# Patient Record
Sex: FEMALE | Race: WHITE | NOT HISPANIC OR LATINO | RURAL
[De-identification: names, ages, dates, MRNs, and addresses within clinical notes are randomized per-mention and may not be internally consistent; named-entity substitution may affect disease eponyms.]

---

## 2022-03-26 ENCOUNTER — HOSPITAL ENCOUNTER (EMERGENCY)
Facility: HOSPITAL | Age: 22
Discharge: HOME | End: 2022-03-26
Payer: MEDICAID

## 2022-03-26 VITALS
WEIGHT: 150 LBS | HEIGHT: 66 IN | BODY MASS INDEX: 24.11 KG/M2 | RESPIRATION RATE: 16 BRPM | TEMPERATURE: 98 F | SYSTOLIC BLOOD PRESSURE: 112 MMHG | OXYGEN SATURATION: 98 % | HEART RATE: 88 BPM | DIASTOLIC BLOOD PRESSURE: 71 MMHG

## 2022-03-26 DIAGNOSIS — S61.211A LACERATION OF LEFT INDEX FINGER WITHOUT FOREIGN BODY WITHOUT DAMAGE TO NAIL, INITIAL ENCOUNTER: Primary | ICD-10-CM

## 2022-03-26 PROCEDURE — 25000003 PHARM REV CODE 250: Performed by: EMERGENCY MEDICINE

## 2022-03-26 PROCEDURE — 99282 PR EMERGENCY DEPT VISIT,LEVEL II: ICD-10-PCS | Mod: ,,, | Performed by: EMERGENCY MEDICINE

## 2022-03-26 PROCEDURE — 99282 EMERGENCY DEPT VISIT SF MDM: CPT | Mod: ,,, | Performed by: EMERGENCY MEDICINE

## 2022-03-26 PROCEDURE — 99283 EMERGENCY DEPT VISIT LOW MDM: CPT

## 2022-03-26 RX ADMIN — BACITRACIN ZINC, NEOMYCIN, POLYMYXIN B 1 EACH: 400; 3.5; 5 OINTMENT TOPICAL at 04:03

## 2022-03-26 NOTE — DISCHARGE INSTRUCTIONS
Please make sure to keep the wound clean with plenty of tap water and soap. If your cut becomes worse or does not heal, or if you develop signs and symptoms of infection, including fevers, please return to the ED as soon as possible.     Please see your PCP in 1 week for ED follow up.

## 2022-03-26 NOTE — ED PROVIDER NOTES
Encounter Date: 3/26/2022       History     Chief Complaint   Patient presents with    Laceration     Pt is a 21 y.o.  female presenting with L index finger LAC since this afternoon. Pt was adjusting the buckle on her daughter's shoe when she sustained the injury. Pt denies any associated sx such as fever, chills, palpitations, diaphoresis, lightheadedness, nausea and vomiting. She has no medical problems and takes no daily medication. She does not suffer from any bleeding diathesis.         Review of patient's allergies indicates:   Allergen Reactions    Penicillins     Amoxicillin-pot clavulanate Nausea And Vomiting and Rash     History reviewed. No pertinent past medical history.  Past Surgical History:   Procedure Laterality Date    SKIN GRAFT Right      History reviewed. No pertinent family history.  Social History     Tobacco Use    Smoking status: Never Smoker    Smokeless tobacco: Never Used   Substance Use Topics    Alcohol use: Never    Drug use: Never     Review of Systems   Constitutional: Negative for fever.   HENT: Negative for sore throat.    Respiratory: Negative for shortness of breath.    Cardiovascular: Negative for chest pain.   Gastrointestinal: Negative for nausea.   Genitourinary: Negative for dysuria.   Musculoskeletal: Negative for back pain.   Skin: Positive for wound (L index finger LAC). Negative for rash.   Neurological: Negative for weakness.   Hematological: Does not bruise/bleed easily.       Physical Exam     Initial Vitals [03/26/22 1554]   BP Pulse Resp Temp SpO2   112/71 88 16 98 °F (36.7 °C) 98 %      MAP       --         Physical Exam    Constitutional: She appears well-developed and well-nourished. She is not diaphoretic. No distress.   HENT:   Head: Normocephalic and atraumatic.   Right Ear: External ear normal.   Left Ear: External ear normal.   Nose: Nose normal.   Mouth/Throat: Oropharynx is clear and moist. No oropharyngeal exudate.   Eyes: EOM are normal.  Right eye exhibits no discharge. Left eye exhibits no discharge. No scleral icterus.   Cardiovascular: Normal rate, regular rhythm, normal heart sounds and intact distal pulses. Exam reveals no gallop and no friction rub.    No murmur heard.  Pulmonary/Chest: Breath sounds normal. No respiratory distress. She has no wheezes. She has no rhonchi. She has no rales. She exhibits no tenderness.   Abdominal: Abdomen is soft. Bowel sounds are normal. She exhibits no distension and no mass. There is no abdominal tenderness. There is no rebound and no guarding.   Musculoskeletal:         General: No tenderness or edema. Normal range of motion.     Neurological: She is alert and oriented to person, place, and time.   Skin: Laceration noted.   1.5 cm superficial linear LAC noted to the lateral aspect of the disal L index finger. No foreign object detected. No purulence, erythema or swelling noted. FROM of the index finger. Neurovascularly intact.           Medical Screening Exam   See Full Note    ED Course   Procedures  Labs Reviewed - No data to display       Imaging Results    None          Medications   neomycin-bacitracnZn-polymyxnB packet (1 each Topical (Top) Given 3/26/22 8873)           APC / Resident Notes:   LAC is superficial and does not require sutures. Will apply antibiotic ointment and bandage and have the PT return should symptoms persist or worsen. Pt understands and agrees with plan of care.        Attending Attestation:   Physician Attestation Statement for Resident:  As the supervising MD   Physician Attestation Statement: I have personally seen and examined this patient.   I agree with the above history. -:   As the supervising MD I agree with the above PE.    As the supervising MD I agree with the above treatment, course, plan, and disposition.                      Clinical Impression:   Final diagnoses:  [S61.211A] Laceration of left index finger without foreign body without damage to nail, initial  encounter (Primary)          ED Disposition Condition    Discharge Stable        ED Prescriptions     None        Follow-up Information     Follow up With Specialties Details Why Contact Info      In 1 week  Please see your PCP in 1 week for ED follow up.           Norberto Swift DO  Resident  03/26/22 1701       Reyes Farris MD  03/26/22 1721

## 2022-04-19 ENCOUNTER — HOSPITAL ENCOUNTER (EMERGENCY)
Facility: HOSPITAL | Age: 22
Discharge: HOME | End: 2022-04-19
Attending: EMERGENCY MEDICINE
Payer: MEDICAID

## 2022-04-19 VITALS
BODY MASS INDEX: 24.91 KG/M2 | SYSTOLIC BLOOD PRESSURE: 106 MMHG | TEMPERATURE: 98 F | RESPIRATION RATE: 18 BRPM | DIASTOLIC BLOOD PRESSURE: 77 MMHG | OXYGEN SATURATION: 99 % | WEIGHT: 155 LBS | HEART RATE: 89 BPM | HEIGHT: 66 IN

## 2022-04-19 DIAGNOSIS — R42 LIGHTHEADEDNESS: ICD-10-CM

## 2022-04-19 LAB
AMORPH PHOS CRY #/AREA URNS LPF: ABNORMAL /LPF
BACTERIA #/AREA URNS HPF: ABNORMAL /HPF
BILIRUB UR QL STRIP: NEGATIVE
CLARITY UR: CLEAR
COARSE GRAN CASTS #/AREA URNS LPF: ABNORMAL /LPF
COLOR UR: ABNORMAL
GLUCOSE UR STRIP-MCNC: NEGATIVE MG/DL
KETONES UR STRIP-SCNC: NEGATIVE MG/DL
LEUKOCYTE ESTERASE UR QL STRIP: NEGATIVE
MUCOUS THREADS #/AREA URNS HPF: ABNORMAL /HPF
NITRITE UR QL STRIP: NEGATIVE
PH UR STRIP: 7.5 PH UNITS
PROT UR QL STRIP: NEGATIVE
RBC # UR STRIP: ABNORMAL /UL
RBC #/AREA URNS HPF: ABNORMAL /HPF
SP GR UR STRIP: 1.01
SQUAMOUS #/AREA URNS LPF: ABNORMAL /LPF
UROBILINOGEN UR STRIP-ACNC: 1 MG/DL
WBC #/AREA URNS HPF: ABNORMAL /HPF

## 2022-04-19 PROCEDURE — 99283 EMERGENCY DEPT VISIT LOW MDM: CPT | Mod: ,,, | Performed by: EMERGENCY MEDICINE

## 2022-04-19 PROCEDURE — 99283 EMERGENCY DEPT VISIT LOW MDM: CPT

## 2022-04-19 PROCEDURE — 99283 PR EMERGENCY DEPT VISIT,LEVEL III: ICD-10-PCS | Mod: ,,, | Performed by: EMERGENCY MEDICINE

## 2022-04-19 PROCEDURE — 81001 URINALYSIS AUTO W/SCOPE: CPT | Performed by: EMERGENCY MEDICINE

## 2022-04-19 NOTE — DISCHARGE INSTRUCTIONS
Follow-up with primary care provider.  Return to ER if her symptoms are worsening or new symptoms develop    Increase your water intake.  Decrease caffeinated drinks.    Use ibuprofen as needed for your occasional headaches.

## 2022-04-19 NOTE — ED PROVIDER NOTES
Encounter Date: 4/19/2022       History     Chief Complaint   Patient presents with    Dizziness     Pt states she woke up and bent over and became dizzy     Patient reports that she had an episode of lightheadedness this morning that lasted for approximately an hour.  It was worse when she got up and moved around.  She did not feel palpitations or chest pain.  She got up during the night with her child who had some subjective fever.  However patient has not had a runny nose or coughing or any other constitutional symptoms.  Patient says she does have history of some heavy cycles but are irregular.  Has not had a prior blood transfusion has not noticed any color changes.  No prior history of thyroid dysfunction.  She has gained weight since her last child a few years ago but no associated constipation no prior workups.  No earache or recent vertigo.  Patient did vomit will accident 3 months ago has had some neck discomfort and some occasional headaches controlled with Tylenol.  She has been under the care of a chiropractor.  No associated focal weakness or numbness of her arms or legs.  Symptoms have resolved spontaneously this morning.  No other associated symptoms or modifying factors.        Review of patient's allergies indicates:   Allergen Reactions    Penicillins     Amoxicillin-pot clavulanate Nausea And Vomiting and Rash     History reviewed. No pertinent past medical history.  Past Surgical History:   Procedure Laterality Date    SKIN GRAFT Right      History reviewed. No pertinent family history.  Social History     Tobacco Use    Smoking status: Never Smoker    Smokeless tobacco: Never Used   Substance Use Topics    Alcohol use: Never    Drug use: Never     Review of Systems   Constitutional: Positive for fatigue. Negative for fever.   HENT: Negative for sore throat.    Respiratory: Negative for shortness of breath.    Cardiovascular: Negative for chest pain.   Gastrointestinal: Negative for  nausea.   Genitourinary: Negative for dysuria.   Musculoskeletal: Negative for back pain.   Skin: Negative for rash.   Neurological: Positive for light-headedness. Negative for weakness.   Hematological: Does not bruise/bleed easily.   Psychiatric/Behavioral: Negative for decreased concentration.       Physical Exam     Initial Vitals [04/19/22 0607]   BP Pulse Resp Temp SpO2   106/77 89 18 97.6 °F (36.4 °C) 99 %      MAP       --         Physical Exam    Constitutional: She appears well-developed and well-nourished.   HENT:   Head: Normocephalic and atraumatic.   Eyes: EOM are normal. Pupils are equal, round, and reactive to light.   Neck: Neck supple. No thyromegaly present.   Normal range of motion.  Cardiovascular: Normal rate and regular rhythm.   Pulmonary/Chest: Breath sounds normal. No respiratory distress. She has no rhonchi. She exhibits no tenderness.   Abdominal: Abdomen is soft.   Musculoskeletal:         General: Normal range of motion.      Cervical back: Normal range of motion and neck supple.     Neurological: She is alert and oriented to person, place, and time.   Skin: Skin is warm and dry. Capillary refill takes less than 2 seconds.   Psychiatric: She has a normal mood and affect.         Medical Screening Exam   See Full Note    ED Course   Procedures  Labs Reviewed   URINALYSIS, REFLEX TO URINE CULTURE - Abnormal; Notable for the following components:       Result Value    Blood, UA Large (*)     All other components within normal limits   URINALYSIS, MICROSCOPIC - Abnormal; Notable for the following components:    Bacteria, UA Few (*)     Squamous Epithelial Cells, UA Few (*)     Mucus, UA Few (*)     Coarse Granular Casts, UA Rare (*)     Amorphous Crystals, UA Few (*)     All other components within normal limits   DRUG SCREEN, URINE (BEAKER)          Imaging Results    None          Medications - No data to display  Medical Decision Making:   ED Management:  Patient feeling well now.   Physical exam reassuring.  Discussed with her multiple aspects of improving or help with sleep exercise diet increasing oral fluid intake decreasing caffeine intake decreasing stress levels and also discussed indications to return to the emergency department and he to follow up with primary care.  Given a list of primary care clinics.  Patient also may be having early symptoms of a viral syndrome since were for children running fever during the night.  She voiced understanding to return if symptoms are worsening or new symptoms develop.                   Clinical Impression:   Final diagnoses:  [R42] Lightheadedness          ED Disposition Condition    Discharge Stable        ED Prescriptions     None        Follow-up Information     Follow up With Specialties Details Why Contact Info    [facility] - Emergency Department Emergency Medicine  If symptoms worsen [id number] [place] [id number] [phone]           Ross Main MD  04/19/22 7598

## 2022-04-20 ENCOUNTER — TELEPHONE (OUTPATIENT)
Dept: EMERGENCY MEDICINE | Facility: HOSPITAL | Age: 22
End: 2022-04-20
Payer: MEDICAID